# Patient Record
Sex: MALE | Race: WHITE | Employment: UNEMPLOYED | URBAN - METROPOLITAN AREA
[De-identification: names, ages, dates, MRNs, and addresses within clinical notes are randomized per-mention and may not be internally consistent; named-entity substitution may affect disease eponyms.]

---

## 2017-01-04 ENCOUNTER — HOSPITAL ENCOUNTER (OUTPATIENT)
Dept: NON INVASIVE DIAGNOSTICS | Age: 42
Discharge: HOME | End: 2017-01-04
Payer: MEDICAID

## 2017-01-04 VITALS — SYSTOLIC BLOOD PRESSURE: 130 MMHG | DIASTOLIC BLOOD PRESSURE: 83 MMHG

## 2017-01-04 DIAGNOSIS — R07.89 OTHER CHEST PAIN: ICD-10-CM

## 2017-01-04 PROCEDURE — 93017 CV STRESS TEST TRACING ONLY: CPT

## 2017-01-19 ENCOUNTER — OFFICE VISIT (OUTPATIENT)
Dept: FAMILY MEDICINE CLINIC | Age: 42
End: 2017-01-19

## 2017-01-19 VITALS
HEIGHT: 69 IN | DIASTOLIC BLOOD PRESSURE: 84 MMHG | TEMPERATURE: 98.9 F | HEART RATE: 92 BPM | SYSTOLIC BLOOD PRESSURE: 128 MMHG | WEIGHT: 238 LBS | RESPIRATION RATE: 18 BRPM | BODY MASS INDEX: 35.25 KG/M2

## 2017-01-19 DIAGNOSIS — K21.9 GASTROESOPHAGEAL REFLUX DISEASE, ESOPHAGITIS PRESENCE NOT SPECIFIED: ICD-10-CM

## 2017-01-19 DIAGNOSIS — Z20.2 STD EXPOSURE: ICD-10-CM

## 2017-01-19 DIAGNOSIS — J20.8 ACUTE BRONCHITIS DUE TO OTHER SPECIFIED ORGANISMS: Primary | ICD-10-CM

## 2017-01-19 DIAGNOSIS — R21 RASH: ICD-10-CM

## 2017-01-19 DIAGNOSIS — I10 ESSENTIAL HYPERTENSION: ICD-10-CM

## 2017-01-19 DIAGNOSIS — E78.1 HYPERTRIGLYCERIDEMIA: ICD-10-CM

## 2017-01-19 LAB
HAV IGM SER IA-ACNC: NORMAL
HEPATITIS B CORE IGM ANTIBODY: NORMAL
HEPATITIS B SURFACE ANTIGEN INTERPRETATION: NORMAL
HEPATITIS C ANTIBODY INTERPRETATION: NORMAL
HEPATITIS INTERPRETATION:: NORMAL

## 2017-01-19 PROCEDURE — 99214 OFFICE O/P EST MOD 30 MIN: CPT | Performed by: FAMILY MEDICINE

## 2017-01-19 RX ORDER — OMEPRAZOLE 40 MG/1
40 CAPSULE, DELAYED RELEASE ORAL DAILY
Qty: 30 CAPSULE | Refills: 3 | Status: SHIPPED | OUTPATIENT
Start: 2017-01-19 | End: 2017-07-24 | Stop reason: SDUPTHER

## 2017-01-19 RX ORDER — ATORVASTATIN CALCIUM 20 MG/1
20 TABLET, FILM COATED ORAL DAILY
Qty: 30 TABLET | Refills: 3 | Status: SHIPPED | OUTPATIENT
Start: 2017-01-19 | End: 2017-10-05 | Stop reason: SDUPTHER

## 2017-01-19 RX ORDER — AMOXICILLIN 500 MG/1
1000 TABLET, FILM COATED ORAL 2 TIMES DAILY
Qty: 40 TABLET | Refills: 0 | Status: SHIPPED | OUTPATIENT
Start: 2017-01-19 | End: 2017-09-26

## 2017-01-19 RX ORDER — ERGOCALCIFEROL 1.25 MG/1
50000 CAPSULE ORAL WEEKLY
Qty: 4 CAPSULE | Refills: 3 | Status: SHIPPED | OUTPATIENT
Start: 2017-01-19

## 2017-01-19 RX ORDER — LISINOPRIL 20 MG/1
20 TABLET ORAL DAILY
Qty: 30 TABLET | Refills: 3 | Status: SHIPPED | OUTPATIENT
Start: 2017-01-19 | End: 2017-08-31 | Stop reason: SDUPTHER

## 2017-01-19 RX ORDER — OMEPRAZOLE 40 MG/1
CAPSULE, DELAYED RELEASE ORAL
Qty: 30 CAPSULE | Refills: 3 | Status: CANCELLED | OUTPATIENT
Start: 2017-01-19

## 2017-01-20 LAB — RPR: NORMAL

## 2017-01-21 LAB — HIV-1 AND HIV-2 ANTIBODIES: NEGATIVE

## 2017-01-22 LAB
HERPES TYPE 1/2 IGM COMBINED: 0.91 IV
HERPES TYPE I/II IGG COMBINED: 0.23 IV

## 2017-04-20 ENCOUNTER — OFFICE VISIT (OUTPATIENT)
Dept: FAMILY MEDICINE CLINIC | Age: 42
End: 2017-04-20

## 2017-04-20 VITALS
RESPIRATION RATE: 18 BRPM | BODY MASS INDEX: 36.43 KG/M2 | HEIGHT: 69 IN | DIASTOLIC BLOOD PRESSURE: 86 MMHG | HEART RATE: 78 BPM | WEIGHT: 246 LBS | SYSTOLIC BLOOD PRESSURE: 126 MMHG

## 2017-04-20 DIAGNOSIS — K21.9 GASTROESOPHAGEAL REFLUX DISEASE WITHOUT ESOPHAGITIS: ICD-10-CM

## 2017-04-20 DIAGNOSIS — G47.9 SLEEPING DIFFICULTY: ICD-10-CM

## 2017-04-20 DIAGNOSIS — E55.9 VITAMIN D DEFICIENCY: ICD-10-CM

## 2017-04-20 DIAGNOSIS — F41.9 ANXIETY: ICD-10-CM

## 2017-04-20 DIAGNOSIS — E66.9 OBESITY, UNSPECIFIED OBESITY SEVERITY, UNSPECIFIED OBESITY TYPE: ICD-10-CM

## 2017-04-20 DIAGNOSIS — L21.9 SEBORRHEIC DERMATITIS: ICD-10-CM

## 2017-04-20 DIAGNOSIS — I10 ESSENTIAL HYPERTENSION: Primary | ICD-10-CM

## 2017-04-20 DIAGNOSIS — F33.1 MODERATE EPISODE OF RECURRENT MAJOR DEPRESSIVE DISORDER (HCC): ICD-10-CM

## 2017-04-20 DIAGNOSIS — J30.1 SEASONAL ALLERGIC RHINITIS DUE TO POLLEN: ICD-10-CM

## 2017-04-20 PROCEDURE — 99214 OFFICE O/P EST MOD 30 MIN: CPT | Performed by: FAMILY MEDICINE

## 2017-04-20 RX ORDER — VENLAFAXINE HYDROCHLORIDE 37.5 MG/1
37.5 CAPSULE, EXTENDED RELEASE ORAL DAILY
Qty: 30 CAPSULE | Refills: 1 | Status: SHIPPED | OUTPATIENT
Start: 2017-04-20 | End: 2017-05-23 | Stop reason: SDUPTHER

## 2017-04-26 DIAGNOSIS — L21.9 SEBORRHEIC DERMATITIS, UNSPECIFIED: Primary | ICD-10-CM

## 2017-05-23 ENCOUNTER — OFFICE VISIT (OUTPATIENT)
Dept: FAMILY MEDICINE CLINIC | Age: 42
End: 2017-05-23

## 2017-05-23 VITALS
BODY MASS INDEX: 35.75 KG/M2 | SYSTOLIC BLOOD PRESSURE: 130 MMHG | HEART RATE: 78 BPM | RESPIRATION RATE: 18 BRPM | TEMPERATURE: 98.2 F | HEIGHT: 69 IN | WEIGHT: 241.38 LBS | DIASTOLIC BLOOD PRESSURE: 85 MMHG

## 2017-05-23 DIAGNOSIS — F31.60 BIPOLAR AFFECTIVE DISORDER, CURRENT EPISODE MIXED, CURRENT EPISODE SEVERITY UNSPECIFIED (HCC): ICD-10-CM

## 2017-05-23 DIAGNOSIS — K21.9 GASTROESOPHAGEAL REFLUX DISEASE WITHOUT ESOPHAGITIS: ICD-10-CM

## 2017-05-23 DIAGNOSIS — J30.1 SEASONAL ALLERGIC RHINITIS DUE TO POLLEN: ICD-10-CM

## 2017-05-23 DIAGNOSIS — F41.9 ANXIETY: ICD-10-CM

## 2017-05-23 DIAGNOSIS — F32.A DEPRESSION, UNSPECIFIED DEPRESSION TYPE: Primary | ICD-10-CM

## 2017-05-23 DIAGNOSIS — I10 ESSENTIAL HYPERTENSION: ICD-10-CM

## 2017-05-23 PROCEDURE — 99214 OFFICE O/P EST MOD 30 MIN: CPT | Performed by: FAMILY MEDICINE

## 2017-05-23 RX ORDER — KETOCONAZOLE 20 MG/ML
SHAMPOO TOPICAL
Refills: 5 | COMMUNITY
Start: 2017-05-03

## 2017-05-23 RX ORDER — VENLAFAXINE HYDROCHLORIDE 75 MG/1
75 CAPSULE, EXTENDED RELEASE ORAL DAILY
Qty: 30 CAPSULE | Refills: 3 | Status: SHIPPED | OUTPATIENT
Start: 2017-05-23 | End: 2017-10-23

## 2017-05-23 ASSESSMENT — PATIENT HEALTH QUESTIONNAIRE - PHQ9
SUM OF ALL RESPONSES TO PHQ9 QUESTIONS 1 & 2: 0
2. FEELING DOWN, DEPRESSED OR HOPELESS: 0
SUM OF ALL RESPONSES TO PHQ QUESTIONS 1-9: 0
1. LITTLE INTEREST OR PLEASURE IN DOING THINGS: 0

## 2017-07-24 DIAGNOSIS — K21.9 GASTROESOPHAGEAL REFLUX DISEASE, ESOPHAGITIS PRESENCE NOT SPECIFIED: ICD-10-CM

## 2017-07-24 RX ORDER — OMEPRAZOLE 40 MG/1
CAPSULE, DELAYED RELEASE ORAL
Qty: 30 CAPSULE | Refills: 5 | Status: SHIPPED | OUTPATIENT
Start: 2017-07-24

## 2017-08-29 ENCOUNTER — OFFICE VISIT (OUTPATIENT)
Dept: FAMILY MEDICINE CLINIC | Age: 42
End: 2017-08-29

## 2017-08-29 VITALS
TEMPERATURE: 98.9 F | SYSTOLIC BLOOD PRESSURE: 182 MMHG | BODY MASS INDEX: 35.55 KG/M2 | RESPIRATION RATE: 18 BRPM | HEART RATE: 94 BPM | DIASTOLIC BLOOD PRESSURE: 120 MMHG | HEIGHT: 69 IN | WEIGHT: 240 LBS

## 2017-08-29 DIAGNOSIS — F41.9 ANXIETY: ICD-10-CM

## 2017-08-29 DIAGNOSIS — F33.1 MODERATE EPISODE OF RECURRENT MAJOR DEPRESSIVE DISORDER (HCC): ICD-10-CM

## 2017-08-29 DIAGNOSIS — F32.A DEPRESSION, UNSPECIFIED DEPRESSION TYPE: ICD-10-CM

## 2017-08-29 DIAGNOSIS — K21.9 GASTROESOPHAGEAL REFLUX DISEASE WITHOUT ESOPHAGITIS: ICD-10-CM

## 2017-08-29 DIAGNOSIS — E55.9 VITAMIN D DEFICIENCY: ICD-10-CM

## 2017-08-29 DIAGNOSIS — L30.9 DERMATITIS: ICD-10-CM

## 2017-08-29 DIAGNOSIS — E66.9 OBESITY, UNSPECIFIED OBESITY SEVERITY, UNSPECIFIED OBESITY TYPE: ICD-10-CM

## 2017-08-29 DIAGNOSIS — J30.1 SEASONAL ALLERGIC RHINITIS DUE TO POLLEN: ICD-10-CM

## 2017-08-29 DIAGNOSIS — Z72.0 TOBACCO ABUSE: ICD-10-CM

## 2017-08-29 DIAGNOSIS — I10 ESSENTIAL HYPERTENSION: Primary | ICD-10-CM

## 2017-08-29 DIAGNOSIS — F31.60 BIPOLAR AFFECTIVE DISORDER, CURRENT EPISODE MIXED, CURRENT EPISODE SEVERITY UNSPECIFIED (HCC): ICD-10-CM

## 2017-08-29 PROCEDURE — 99214 OFFICE O/P EST MOD 30 MIN: CPT | Performed by: FAMILY MEDICINE

## 2017-08-31 ENCOUNTER — TELEPHONE (OUTPATIENT)
Dept: FAMILY MEDICINE CLINIC | Age: 42
End: 2017-08-31

## 2017-08-31 DIAGNOSIS — E78.1 HYPERTRIGLYCERIDEMIA: ICD-10-CM

## 2017-08-31 DIAGNOSIS — I10 ESSENTIAL HYPERTENSION: ICD-10-CM

## 2017-08-31 RX ORDER — LISINOPRIL 20 MG/1
20 TABLET ORAL DAILY
Qty: 30 TABLET | Refills: 3 | Status: SHIPPED | OUTPATIENT
Start: 2017-08-31 | End: 2017-09-26 | Stop reason: SDUPTHER

## 2017-09-26 ENCOUNTER — OFFICE VISIT (OUTPATIENT)
Dept: FAMILY MEDICINE CLINIC | Age: 42
End: 2017-09-26

## 2017-09-26 ENCOUNTER — HOSPITAL ENCOUNTER (OUTPATIENT)
Dept: GENERAL RADIOLOGY | Age: 42
Discharge: HOME | End: 2017-09-26
Payer: MEDICAID

## 2017-09-26 VITALS
OXYGEN SATURATION: 97 % | SYSTOLIC BLOOD PRESSURE: 140 MMHG | WEIGHT: 247 LBS | DIASTOLIC BLOOD PRESSURE: 92 MMHG | BODY MASS INDEX: 36.58 KG/M2 | HEART RATE: 81 BPM | HEIGHT: 69 IN | TEMPERATURE: 97.3 F

## 2017-09-26 DIAGNOSIS — R05.9 COUGH: ICD-10-CM

## 2017-09-26 DIAGNOSIS — R53.83 FATIGUE, UNSPECIFIED TYPE: ICD-10-CM

## 2017-09-26 DIAGNOSIS — R51.9 NONINTRACTABLE HEADACHE, UNSPECIFIED CHRONICITY PATTERN, UNSPECIFIED HEADACHE TYPE: ICD-10-CM

## 2017-09-26 DIAGNOSIS — I10 ESSENTIAL HYPERTENSION: Primary | ICD-10-CM

## 2017-09-26 DIAGNOSIS — R55 SYNCOPE, UNSPECIFIED SYNCOPE TYPE: ICD-10-CM

## 2017-09-26 DIAGNOSIS — G93.40 ENCEPHALOPATHY: ICD-10-CM

## 2017-09-26 DIAGNOSIS — K21.9 GASTROESOPHAGEAL REFLUX DISEASE WITHOUT ESOPHAGITIS: ICD-10-CM

## 2017-09-26 PROCEDURE — 99214 OFFICE O/P EST MOD 30 MIN: CPT | Performed by: FAMILY MEDICINE

## 2017-09-26 PROCEDURE — 71020 XR CHEST STANDARD TWO VW: CPT

## 2017-09-26 RX ORDER — LISINOPRIL 40 MG/1
40 TABLET ORAL DAILY
Qty: 30 TABLET | Refills: 2 | Status: SHIPPED | OUTPATIENT
Start: 2017-09-26

## 2017-09-26 NOTE — MR AVS SNAPSHOT
After Visit Summary             Darren Platt   2017 10:45 AM   Office Visit    Description:  Male : 1975   Provider:  Mark Wakefield MD   Department:  [facility]              Your Follow-Up and Future Appointments         Below is a list of your follow-up and future appointments. This may not be a complete list as you may have made appointments directly with providers that we are not aware of or your providers may have made some for you. Please call your providers to confirm appointments. It is important to keep your appointments. Please bring your current insurance card, photo ID, co-pay, and all medication bottles to your appointment. If self-pay, payment is expected at the time of service. Your To-Do List     Future Appointments Provider Department Dept Phone    10/23/2017 2:45 PM MD Yony Metcalf -[phone]    Please arrive 15 minutes prior to appointment, bring photo ID and insurance card. Future Orders Complete By Expires    MRI BRAIN WO CONTRAST [39935 Custom]  2017    XR CHEST STANDARD (2 VW) [56719 Custom]  10/3/2017 (Approximate) 2018    CBC Auto Differential [[id number] Custom]  10/10/2017 2018    Comprehensive Metabolic Panel [SAL09 Custom]  10/10/2017 2018    Lipid Panel [LAB18 Custom]  10/10/2017 2018    TSH with Reflex [[id number] Custom]  3/25/2018 2018    Follow-Up    Return in about 4 weeks (around 10/24/2017). Information from Your Visit        Department     Name Address Phone Fax    [facility] [address] [place] [address].   [facility] [address] [phone]      You Were Seen for:         Comments    Essential hypertension   [304043]         Vital Signs     Blood Pressure Pulse Temperature Height Weight Oxygen Saturation    140/92 81 97.3 °F (36.3 °C) (Temporal) 5' 9\" (1.753 m) 247 lb (112 kg) 97%    Body Mass Index Smoking Status 36.48 kg/m2 Current Every Day Smoker          Additional Information about your Body Mass Index (BMI)           Your BMI as listed above is considered obese (30 or more). BMI is an estimate of body fat, calculated from your height and weight. The higher your BMI, the greater your risk of heart disease, high blood pressure, type 2 diabetes, stroke, gallstones, arthritis, sleep apnea, and certain cancers. BMI is not perfect. It may overestimate body fat in athletes and people who are more muscular. Even a small weight loss (between 5 and 10 percent of your current weight) by decreasing your calorie intake and becoming more physically active will help lower your risk of developing or worsening diseases associated with obesity. Learn more at: blogfoster.uk             Today's Medication Changes          These changes are accurate as of: 9/26/17 11:48 AM.  If you have any questions, ask your nurse or doctor. CHANGE how you take these medications           lisinopril 40 MG tablet   Commonly known as:  PRINIVIL;ZESTRIL   Instructions:   Take 1 tablet by mouth daily   Quantity:  30 tablet   Refills:  2   What changed:    - medication strength  - how much to take   Changed by:  Sharon Joseph MD         STOP taking these medications           Amoxicillin 500 MG Tabs   Stopped by:  Sharon Joseph MD            Where to Get Your Medications      These medications were sent to [address] [place] [address] [address], [place] [facility]  [address] [place] [address] Iris Qureshi     Phone:  [phone]     lisinopril 40 MG tablet               Your Current Medications Are              lisinopril (PRINIVIL;ZESTRIL) 40 MG tablet Take 1 tablet by mouth daily    omeprazole (PRILOSEC) 40 MG delayed release capsule TAKE 1 CAPSULE BY MOUTH DAILY    ketoconazole (NIZORAL) 2 % shampoo Wash the scalp, face, and chest daily econazole nitrate 1 % cream APPLY TO THE AFFECTED AREA(S) TWICE DAILY    atorvastatin (LIPITOR) 20 MG tablet Take 1 tablet by mouth daily    ketoconazole (NIZORAL) 2 % cream Apply topically daily. venlafaxine (EFFEXOR XR) 75 MG extended release capsule Take 1 capsule by mouth daily    vitamin D (ERGOCALCIFEROL) 90627 UNITS CAPS capsule Take 1 capsule by mouth once a week      Allergies           No Known Allergies         Additional Information        Basic Information     Date Of Birth Sex Race Ethnicity Preferred Language    1975 Male White Non-/Non  English      Problem List as of 9/26/2017  Date Reviewed: 8/29/2017                Tobacco abuse    Moderate episode of recurrent major depressive disorder (HCC)    Vitamin D deficiency    Seasonal allergies    Essential hypertension    Obesity    Gastroesophageal reflux disease    Sleeping difficulty    Bipolar disorder ([facility]    Anxiety    Depression      Preventive Care        Date Due    Tetanus Combination Vaccine (1 - Tdap) 12/28/1995    Yearly Flu Vaccine (1) 12/1/2017 (Originally 9/1/2017)    Pneumococcal Vaccine - Pneumovax for adults aged 19-64 years with: chronic heart disease, chronic lung disease, diabetes mellitus, alcoholism, chronic liver disease, or cigarette smoking. (1 of 1 - PPSV23) 12/1/2017 (Originally 12/28/1995)    Cholesterol Screening 6/3/2021            [employer] Signup           Our records indicate that you have an active [employer] account. You can view your After Visit Summary by going to [email or web address] and logging in with your [employer] username and password. If you don't have a [employer] username and password but a parent or guardian has access to your record, the parent or guardian should login with their own [employer] username and password and access your record to view the After Visit Summary.      Additional Information

## 2017-09-26 NOTE — PROGRESS NOTES
activity: No     Other Topics Concern    Not on file     Social History Narrative       No Known Allergies    Review of Systems - General ROS: negative  Psychological ROS: negative  ENT ROS: negative  Hematological and Lymphatic ROS: negative  Endocrine ROS: negative  Respiratory ROS: +cough,no shortness of breath, no wheezing  Cardiovascular ROS: no chest pain or dyspnea on exertion  Gastrointestinal ROS: no abdominal pain, change in bowel habits, or black or bloody stools  Genito-Urinary ROS: no dysuria, trouble voiding, or hematuria  Musculoskeletal ROS: negative  Neurological ROS: no TIA or stroke symptoms  Dermatological ROS: rash    Blood pressure (!) 140/92, pulse 81, temperature 97.3 °F (36.3 °C), temperature source Temporal, height 5' 9\" (1.753 m), weight 247 lb (112 kg), SpO2 97 %. Physical Examination: General appearance - alert, well appearing, and in no distress  Mental status - alert, oriented to person, place, and time  Eyes - pupils equal and reactive, extraocular eye movements intact  Ears - bilateral TM's and external ear canals normal  Mouth - mucous membranes moist, pharynx normal without lesions  Neck - supple, no significant adenopathy  Lymphatics - no palpable lymphadenopathy, no hepatosplenomegaly  Chest - clear to auscultation, no wheezes, rales or rhonchi, symmetric air entry  Heart - normal rate, regular rhythm, normal S1, S2, no murmurs, rubs, clicks or gallops  Abdomen - soft, nontender, nondistended, no masses or organomegaly, obese  Neurological - alert, oriented, normal speech, no focal findings or movement disorder noted  Musculoskeletal - no joint tenderness, deformity or swelling  Extremities - peripheral pulses normal, no pedal edema, no clubbing or cyanosis  Skin - seb derm changes head, o/w normal coloration and turgor, no rashes, no suspicious skin lesions noted; Chest x-ray normal  Assessment:    1.  Essential hypertension  lisinopril (PRINIVIL;ZESTRIL) 40 MG tablet

## 2017-09-28 DIAGNOSIS — R55 SYNCOPE, UNSPECIFIED SYNCOPE TYPE: ICD-10-CM

## 2017-09-28 DIAGNOSIS — R53.83 FATIGUE, UNSPECIFIED TYPE: ICD-10-CM

## 2017-09-28 DIAGNOSIS — I10 ESSENTIAL HYPERTENSION: ICD-10-CM

## 2017-09-28 LAB
ALBUMIN SERPL-MCNC: 4.5 G/DL (ref 3.9–4.9)
ALP BLD-CCNC: 59 U/L (ref 35–104)
ALT SERPL-CCNC: 31 U/L (ref 0–41)
ANION GAP SERPL CALCULATED.3IONS-SCNC: 17 MEQ/L (ref 7–13)
AST SERPL-CCNC: 20 U/L (ref 0–40)
BASOPHILS ABSOLUTE: 0.1 K/UL (ref 0–0.2)
BASOPHILS RELATIVE PERCENT: 0.7 %
BILIRUB SERPL-MCNC: 0.4 MG/DL (ref 0–1.2)
BUN BLDV-MCNC: 15 MG/DL (ref 6–20)
CALCIUM SERPL-MCNC: 9 MG/DL (ref 8.6–10.2)
CHLORIDE BLD-SCNC: 99 MEQ/L (ref 98–107)
CHOLESTEROL, TOTAL: 251 MG/DL (ref 0–199)
CO2: 24 MEQ/L (ref 22–29)
CREAT SERPL-MCNC: 0.8 MG/DL (ref 0.7–1.2)
EOSINOPHILS ABSOLUTE: 0.3 K/UL (ref 0–0.7)
EOSINOPHILS RELATIVE PERCENT: 3.9 %
GFR AFRICAN AMERICAN: >60
GFR NON-AFRICAN AMERICAN: >60
GLOBULIN: 2.8 G/DL (ref 2.3–3.5)
GLUCOSE BLD-MCNC: 98 MG/DL (ref 74–109)
HCT VFR BLD CALC: 44.8 % (ref 42–52)
HDLC SERPL-MCNC: 41 MG/DL (ref 40–59)
HEMOGLOBIN: 14.6 G/DL (ref 14–18)
LDL CHOLESTEROL CALCULATED: 153 MG/DL (ref 0–129)
LYMPHOCYTES ABSOLUTE: 2.1 K/UL (ref 1–4.8)
LYMPHOCYTES RELATIVE PERCENT: 25.6 %
MCH RBC QN AUTO: 25.2 PG (ref 27–31.3)
MCHC RBC AUTO-ENTMCNC: 32.6 % (ref 33–37)
MCV RBC AUTO: 77.2 FL (ref 80–100)
MONOCYTES ABSOLUTE: 0.8 K/UL (ref 0.2–0.8)
MONOCYTES RELATIVE PERCENT: 9.1 %
NEUTROPHILS ABSOLUTE: 5.1 K/UL (ref 1.4–6.5)
NEUTROPHILS RELATIVE PERCENT: 60.7 %
PDW BLD-RTO: 13.9 % (ref 11.5–14.5)
PLATELET # BLD: 210 K/UL (ref 130–400)
POTASSIUM SERPL-SCNC: 4.7 MEQ/L (ref 3.5–5.1)
RBC # BLD: 5.81 M/UL (ref 4.7–6.1)
SODIUM BLD-SCNC: 140 MEQ/L (ref 132–144)
TOTAL PROTEIN: 7.3 G/DL (ref 6.4–8.1)
TRIGL SERPL-MCNC: 286 MG/DL (ref 0–200)
TSH REFLEX: 1.58 UIU/ML (ref 0.27–4.2)
WBC # BLD: 8.3 K/UL (ref 4.8–10.8)

## 2017-10-03 ENCOUNTER — TELEPHONE (OUTPATIENT)
Dept: FAMILY MEDICINE CLINIC | Age: 42
End: 2017-10-03

## 2017-10-03 DIAGNOSIS — R51.9 INTRACTABLE EPISODIC HEADACHE, UNSPECIFIED HEADACHE TYPE: Primary | ICD-10-CM

## 2017-10-03 DIAGNOSIS — R55 SYNCOPE, UNSPECIFIED SYNCOPE TYPE: ICD-10-CM

## 2017-10-03 NOTE — TELEPHONE ENCOUNTER
Patient called back regarding his lab results. Advised lab results and he states he will try Lipitor 20 mg and that medication could be sent to [facility]. Patient also stated that his MRI was denied by insurance and appointment was canceled and he was told to call his 's office to check what Eduardo Serrano wants patient to do. Please advise.

## 2017-10-05 ENCOUNTER — TELEPHONE (OUTPATIENT)
Dept: FAMILY MEDICINE CLINIC | Age: 42
End: 2017-10-05

## 2017-10-05 DIAGNOSIS — R51.9 NONINTRACTABLE HEADACHE, UNSPECIFIED CHRONICITY PATTERN, UNSPECIFIED HEADACHE TYPE: ICD-10-CM

## 2017-10-05 DIAGNOSIS — R55 SYNCOPE, UNSPECIFIED SYNCOPE TYPE: Primary | ICD-10-CM

## 2017-10-05 DIAGNOSIS — G93.40 ENCEPHALOPATHY: ICD-10-CM

## 2017-10-05 DIAGNOSIS — E78.1 HYPERTRIGLYCERIDEMIA: ICD-10-CM

## 2017-10-05 RX ORDER — ATORVASTATIN CALCIUM 20 MG/1
20 TABLET, FILM COATED ORAL DAILY
Qty: 30 TABLET | Refills: 3 | Status: SHIPPED | OUTPATIENT
Start: 2017-10-05

## 2017-10-23 ENCOUNTER — OFFICE VISIT (OUTPATIENT)
Dept: FAMILY MEDICINE CLINIC | Age: 42
End: 2017-10-23

## 2017-10-23 VITALS
OXYGEN SATURATION: 98 % | RESPIRATION RATE: 16 BRPM | HEIGHT: 69 IN | HEART RATE: 99 BPM | BODY MASS INDEX: 36.73 KG/M2 | WEIGHT: 248 LBS | DIASTOLIC BLOOD PRESSURE: 84 MMHG | TEMPERATURE: 97.2 F | SYSTOLIC BLOOD PRESSURE: 138 MMHG

## 2017-10-23 DIAGNOSIS — J30.1 CHRONIC SEASONAL ALLERGIC RHINITIS DUE TO POLLEN: ICD-10-CM

## 2017-10-23 DIAGNOSIS — I10 ESSENTIAL HYPERTENSION: ICD-10-CM

## 2017-10-23 DIAGNOSIS — F32.A DEPRESSION, UNSPECIFIED DEPRESSION TYPE: Primary | ICD-10-CM

## 2017-10-23 DIAGNOSIS — K21.9 GASTROESOPHAGEAL REFLUX DISEASE WITHOUT ESOPHAGITIS: ICD-10-CM

## 2017-10-23 DIAGNOSIS — F41.9 ANXIETY: ICD-10-CM

## 2017-10-23 DIAGNOSIS — F31.60 BIPOLAR AFFECTIVE DISORDER, CURRENT EPISODE MIXED, CURRENT EPISODE SEVERITY UNSPECIFIED (HCC): ICD-10-CM

## 2017-10-23 PROCEDURE — G8427 DOCREV CUR MEDS BY ELIG CLIN: HCPCS | Performed by: FAMILY MEDICINE

## 2017-10-23 PROCEDURE — G8484 FLU IMMUNIZE NO ADMIN: HCPCS | Performed by: FAMILY MEDICINE

## 2017-10-23 PROCEDURE — 4004F PT TOBACCO SCREEN RCVD TLK: CPT | Performed by: FAMILY MEDICINE

## 2017-10-23 PROCEDURE — 99214 OFFICE O/P EST MOD 30 MIN: CPT | Performed by: FAMILY MEDICINE

## 2017-10-23 PROCEDURE — G8417 CALC BMI ABV UP PARAM F/U: HCPCS | Performed by: FAMILY MEDICINE

## 2017-10-23 NOTE — PROGRESS NOTES
Chief Complaint   Patient presents with    Hypertension     4 wk f/u    Gastroesophageal Reflux    Fatigue   multiple issues    Headache and encephalopathy issues have been relatively stable  Episodes of syncope  MRI brain was ordered but insurance declined, he should should see neurology and this has been ordered    seb derm symptoms have been stable  No new issues reported    Cp resolved  No angina  Burning sensation    Nausea resolved  resolved abd pain  Epigastric  No bleeding    Obese condition continues  Needs weight loss    Mood issues generally stable  meds help  depressed  hx bipolar  declines psych eval  No suicidal/homocidal ideation. No psychotic or manic symptoms. Blood pressure stable  No chest pain or shortness of breath reported    Low vit d  Needs supplement    Patient presents for  exam.        Patient Active Problem List   Diagnosis    Anxiety    Depression    B[facility] disorder ([facility]    Seasonal allergies    Essential hypertension    Obesity    Gastroesophageal reflux disease    Sleeping difficulty    Vitamin D deficiency    [facility]    Tobacco abuse       has a current medication list which includes the following prescription(s): atorvastatin, lisinopril, omeprazole, ketoconazole, econazole nitrate, vitamin d, and ketoconazole. Past Medical History:   Diagnosis Date    Anxiety     Bipolar disorder ([facility] 12/21/2012    Depression     Hypertension        History reviewed. No pertinent surgical history. family history includes Cancer in his father; High Blood Pressure in his father and mother. Social History     Social History    Marital status: Single     Spouse name: N/A    Number of children: N/A    Years of education: N/A     Occupational History    Not on file.      Social History Main Topics    Smoking status: Current Every Day Smoker     Packs/day: 0.50     Types: Cigarettes, Cigars     Last attempt to quit: